# Patient Record
Sex: MALE | Race: WHITE | URBAN - METROPOLITAN AREA
[De-identification: names, ages, dates, MRNs, and addresses within clinical notes are randomized per-mention and may not be internally consistent; named-entity substitution may affect disease eponyms.]

---

## 2019-04-18 ENCOUNTER — OFFICE VISIT (OUTPATIENT)
Dept: URBAN - METROPOLITAN AREA CLINIC 76 | Facility: CLINIC | Age: 24
End: 2019-04-18
Payer: MEDICARE

## 2019-04-18 DIAGNOSIS — H50.012: ICD-10-CM

## 2019-04-18 PROCEDURE — 92014 COMPRE OPH EXAM EST PT 1/>: CPT | Performed by: OPTOMETRIST

## 2019-04-18 ASSESSMENT — INTRAOCULAR PRESSURE
OD: 12
OS: 12

## 2019-04-18 ASSESSMENT — KERATOMETRY
OS: 43.88
OD: 42.50

## 2019-04-18 NOTE — IMPRESSION/PLAN
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 asymptomatic. Plan: Discussed condition. Recommend continue to monitor. Pt to contact us if worsens or becomes more frequent.

## 2019-04-18 NOTE — IMPRESSION/PLAN
Impression: Monocular esotropia of lt eye: H50.012. constant. OS. Plan: Discussed condition in great detail. No treatment recommended at this time. Can consider vision therapy in future if pt would like.

## 2019-04-18 NOTE — IMPRESSION/PLAN
Impression: Astigmatism, regular, bilateral: H52.223. OU. Plan: Ok to schedule refraction if/when pt would like. Recommend Nickel free frames.

## 2021-11-10 ENCOUNTER — OFFICE VISIT (OUTPATIENT)
Dept: URBAN - METROPOLITAN AREA CLINIC 76 | Facility: CLINIC | Age: 26
End: 2021-11-10
Payer: MEDICARE

## 2021-11-10 DIAGNOSIS — H10.022 OTHER MUCOPURULENT CONJUNCTIVITIS, LEFT EYE: ICD-10-CM

## 2021-11-10 DIAGNOSIS — H02.412 MECHANICAL PTOSIS OF LT EYELID: Primary | ICD-10-CM

## 2021-11-10 DIAGNOSIS — H52.223 ASTIGMATISM, REGULAR, BILATERAL: ICD-10-CM

## 2021-11-10 DIAGNOSIS — H53.021 REFRACTIVE AMBLYOPIA, RIGHT EYE: ICD-10-CM

## 2021-11-10 PROCEDURE — 92014 COMPRE OPH EXAM EST PT 1/>: CPT | Performed by: OPTOMETRIST

## 2021-11-10 RX ORDER — NEOMYCIN SULFATE, POLYMYXIN B SULFATE AND DEXAMETHASONE 3.5; 10000; 1 MG/ML; [USP'U]/ML; MG/ML
SUSPENSION OPHTHALMIC
Qty: 5 | Refills: 1 | Status: ACTIVE
Start: 2021-11-10

## 2021-11-10 ASSESSMENT — KERATOMETRY
OS: 44.63
OD: 42.63

## 2021-11-10 ASSESSMENT — INTRAOCULAR PRESSURE
OS: 12
OD: 12

## 2021-11-10 NOTE — IMPRESSION/PLAN
Impression: Monocular esotropia of lt eye: H50.012. constant. Plan: Discussed condition in great detail. No treatment recommended at this time. Can consider vision therapy in future if pt would like.

## 2021-11-10 NOTE — IMPRESSION/PLAN
Impression: Other mucopurulent conjunctivitis, left eye: H10.022. likely related to allergies. Plan: Discussed condition. Can use Maxitrol TID OD for no more than 5-7 days per month. Glaucoma precautions reviewed. Can use Ketotifen BID OU PRN for itch.

## 2021-11-10 NOTE — IMPRESSION/PLAN
Impression: Astigmatism, regular, bilateral: H52.223. Plan: Ok to schedule refraction if/when pt would like. Recommend Nickel free frames.

## 2022-12-22 ENCOUNTER — OFFICE VISIT (OUTPATIENT)
Dept: URBAN - METROPOLITAN AREA CLINIC 76 | Facility: CLINIC | Age: 27
End: 2022-12-22
Payer: MEDICARE

## 2022-12-22 DIAGNOSIS — H02.412 MECHANICAL PTOSIS OF LT EYELID: ICD-10-CM

## 2022-12-22 DIAGNOSIS — H53.021 REFRACTIVE AMBLYOPIA, RIGHT EYE: ICD-10-CM

## 2022-12-22 DIAGNOSIS — H50.012: ICD-10-CM

## 2022-12-22 DIAGNOSIS — H52.223 REGULAR ASTIGMATISM, BILATERAL: Primary | ICD-10-CM

## 2022-12-22 PROCEDURE — 92014 COMPRE OPH EXAM EST PT 1/>: CPT | Performed by: OPTOMETRIST

## 2022-12-22 ASSESSMENT — VISUAL ACUITY
OD: 20/30
OS: 20/30

## 2022-12-22 ASSESSMENT — INTRAOCULAR PRESSURE
OD: 12
OS: 12

## 2022-12-22 NOTE — IMPRESSION/PLAN
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 asymptomatic. Left. Plan: Discussed condition. Recommend continue to monitor. Pt to contact us if worsens or becomes more frequent.

## 2023-12-21 ENCOUNTER — OFFICE VISIT (OUTPATIENT)
Dept: URBAN - METROPOLITAN AREA CLINIC 76 | Facility: CLINIC | Age: 28
End: 2023-12-21
Payer: MEDICARE

## 2023-12-21 DIAGNOSIS — H53.021 REFRACTIVE AMBLYOPIA, RIGHT EYE: ICD-10-CM

## 2023-12-21 DIAGNOSIS — H02.412 MECHANICAL PTOSIS OF LT EYELID: ICD-10-CM

## 2023-12-21 DIAGNOSIS — H52.223 REGULAR ASTIGMATISM, BILATERAL: ICD-10-CM

## 2023-12-21 DIAGNOSIS — H50.012: Primary | ICD-10-CM

## 2023-12-21 PROCEDURE — 92014 COMPRE OPH EXAM EST PT 1/>: CPT | Performed by: OPTOMETRIST

## 2023-12-21 ASSESSMENT — KERATOMETRY
OS: 45.00
OD: 42.25

## 2023-12-21 ASSESSMENT — VISUAL ACUITY
OS: 20/20
OD: 20/25

## 2023-12-21 ASSESSMENT — INTRAOCULAR PRESSURE
OS: 10
OD: 10

## 2024-12-18 ENCOUNTER — OFFICE VISIT (OUTPATIENT)
Dept: URBAN - METROPOLITAN AREA CLINIC 76 | Facility: CLINIC | Age: 29
End: 2024-12-18
Payer: MEDICARE

## 2024-12-18 DIAGNOSIS — H50.012: Primary | ICD-10-CM

## 2024-12-18 DIAGNOSIS — H02.412 MECHANICAL PTOSIS OF LT EYELID: ICD-10-CM

## 2024-12-18 DIAGNOSIS — H52.223 REGULAR ASTIGMATISM, BILATERAL: ICD-10-CM

## 2024-12-18 DIAGNOSIS — H53.021 REFRACTIVE AMBLYOPIA, RIGHT EYE: ICD-10-CM

## 2024-12-18 PROCEDURE — 99213 OFFICE O/P EST LOW 20 MIN: CPT | Performed by: OPTOMETRIST

## 2024-12-18 ASSESSMENT — INTRAOCULAR PRESSURE
OD: 12
OS: 13

## 2024-12-18 ASSESSMENT — VISUAL ACUITY
OD: 20/20
OS: 20/20

## 2024-12-18 ASSESSMENT — KERATOMETRY
OS: 45.00
OD: 42.13